# Patient Record
Sex: MALE | Race: WHITE | NOT HISPANIC OR LATINO | Employment: FULL TIME | URBAN - METROPOLITAN AREA
[De-identification: names, ages, dates, MRNs, and addresses within clinical notes are randomized per-mention and may not be internally consistent; named-entity substitution may affect disease eponyms.]

---

## 2021-01-11 ENCOUNTER — LAB VISIT (OUTPATIENT)
Dept: PRIMARY CARE CLINIC | Facility: OTHER | Age: 36
End: 2021-01-11
Attending: INTERNAL MEDICINE
Payer: OTHER GOVERNMENT

## 2021-01-11 DIAGNOSIS — Z20.822 ENCOUNTER FOR LABORATORY TESTING FOR COVID-19 VIRUS: ICD-10-CM

## 2021-01-11 PROCEDURE — U0003 INFECTIOUS AGENT DETECTION BY NUCLEIC ACID (DNA OR RNA); SEVERE ACUTE RESPIRATORY SYNDROME CORONAVIRUS 2 (SARS-COV-2) (CORONAVIRUS DISEASE [COVID-19]), AMPLIFIED PROBE TECHNIQUE, MAKING USE OF HIGH THROUGHPUT TECHNOLOGIES AS DESCRIBED BY CMS-2020-01-R: HCPCS

## 2021-01-13 LAB — SARS-COV-2 RNA RESP QL NAA+PROBE: NOT DETECTED

## 2025-05-19 ENCOUNTER — TELEPHONE (OUTPATIENT)
Dept: UROLOGY | Facility: CLINIC | Age: 40
End: 2025-05-19
Payer: COMMERCIAL

## 2025-05-19 ENCOUNTER — OFFICE VISIT (OUTPATIENT)
Dept: INTERNAL MEDICINE | Facility: CLINIC | Age: 40
End: 2025-05-19
Payer: COMMERCIAL

## 2025-05-19 VITALS
DIASTOLIC BLOOD PRESSURE: 66 MMHG | HEIGHT: 71 IN | SYSTOLIC BLOOD PRESSURE: 108 MMHG | WEIGHT: 122.13 LBS | HEART RATE: 87 BPM | OXYGEN SATURATION: 97 % | BODY MASS INDEX: 17.1 KG/M2

## 2025-05-19 DIAGNOSIS — F33.41 RECURRENT MAJOR DEPRESSIVE DISORDER, IN PARTIAL REMISSION: ICD-10-CM

## 2025-05-19 DIAGNOSIS — Z80.42 FAMILY HISTORY OF PROSTATE CANCER: ICD-10-CM

## 2025-05-19 DIAGNOSIS — Z00.00 ROUTINE GENERAL MEDICAL EXAMINATION AT A HEALTH CARE FACILITY: Primary | ICD-10-CM

## 2025-05-19 DIAGNOSIS — K21.9 GASTROESOPHAGEAL REFLUX DISEASE, UNSPECIFIED WHETHER ESOPHAGITIS PRESENT: ICD-10-CM

## 2025-05-19 PROCEDURE — 3074F SYST BP LT 130 MM HG: CPT | Mod: CPTII,S$GLB,, | Performed by: INTERNAL MEDICINE

## 2025-05-19 PROCEDURE — 3008F BODY MASS INDEX DOCD: CPT | Mod: CPTII,S$GLB,, | Performed by: INTERNAL MEDICINE

## 2025-05-19 PROCEDURE — 1160F RVW MEDS BY RX/DR IN RCRD: CPT | Mod: CPTII,S$GLB,, | Performed by: INTERNAL MEDICINE

## 2025-05-19 PROCEDURE — 1159F MED LIST DOCD IN RCRD: CPT | Mod: CPTII,S$GLB,, | Performed by: INTERNAL MEDICINE

## 2025-05-19 PROCEDURE — 3078F DIAST BP <80 MM HG: CPT | Mod: CPTII,S$GLB,, | Performed by: INTERNAL MEDICINE

## 2025-05-19 PROCEDURE — 99999 PR PBB SHADOW E&M-NEW PATIENT-LVL IV: CPT | Mod: PBBFAC,,, | Performed by: INTERNAL MEDICINE

## 2025-05-19 PROCEDURE — 99213 OFFICE O/P EST LOW 20 MIN: CPT | Mod: 25,S$GLB,, | Performed by: INTERNAL MEDICINE

## 2025-05-19 PROCEDURE — 99386 PREV VISIT NEW AGE 40-64: CPT | Mod: S$GLB,,, | Performed by: INTERNAL MEDICINE

## 2025-05-19 RX ORDER — BUPROPION HYDROCHLORIDE 150 MG/1
150 TABLET ORAL DAILY
Qty: 90 TABLET | Refills: 0 | Status: SHIPPED | OUTPATIENT
Start: 2025-05-19

## 2025-05-19 RX ORDER — OMEPRAZOLE 40 MG/1
40 CAPSULE, DELAYED RELEASE ORAL
Qty: 180 CAPSULE | Refills: 3 | Status: SHIPPED | OUTPATIENT
Start: 2025-05-19 | End: 2026-05-19

## 2025-05-19 NOTE — PROGRESS NOTES
"Subjective:      Patient ID: Keith Pelletier is a 40 y.o. male.    Chief Complaint: Annual Exam and Depression      Keith Pelletier is a 40 y.o. male with chronic conditions significant for MDD.  Presenting today for ECA.     The patient consents verbally to being recorded for DEMANDIT service today.     History of Present Illness    CHIEF COMPLAINT:  Mr. Pelletier presents today for severe reflux and depression.    PSYCHIATRIC HISTORY:  He reports lifelong intermittent mild depression. He has a history of adverse reaction to Risperdal resulting in hospitalization with seizures and muscle rigidity. He continues to experience panic attacks and tonic seizures which began after Risperdal use.    GASTROINTESTINAL:  He reports lifelong symptoms consistent with self-diagnosed irritable bowel syndrome (IBS).    SOCIAL HISTORY:  He currently smokes one pack of cigarettes daily and consumes alcohol. He quit marijuana 10 years ago and has past recreational use of cocaine and LSD, but denies current illicit drug use.    FAMILY HISTORY:  His father and uncle have history of prostate cancer.    ---------------------------  This part of the note is separate from the annual visit as this a new and separate problem(s) that was discussed during this visit. This section of the note is pertains to and is billable as part of the modifier 25.    GERD:  He experiences severe reflux symptoms, particularly in the morning, waking up gagging with symptoms persisting 1-2 hours after waking. The condition has been present for approximately 2 years but has significantly worsened over the past month, described as "dysfunctionally bad." A recent stomach illness exacerbated his symptoms, making it difficult to function until noon. The reflux has been severe enough to cause perceived muscle strain in his abdomen. He takes Omeprazole which helps when taken the night before.      ROS:  General: +trembling  Gastrointestinal: -nausea, " "-vomiting, +indigestion, +gagging, +change in bowel habits  Musculoskeletal: +muscle stiffness, +muscle tension  Psychiatric: +depression, +panic attacks          Current Medications[1]    No results found for: "HGBA1C"  No results found for: "MICALBCREAT"  No results found for: "LDLCALC", "CHOL", "HDL", "TRIG"    No results found for: "NA", "K", "CL", "CO2", "GLU", "BUN", "CREATININE", "CALCIUM", "PROT", "ALBUMIN", "BILITOT", "ALKPHOS", "AST", "ALT", "ANIONGAP", "ESTGFRAFRICA", "EGFRNONAA", "WBC", "HGB", "HCT", "MCV", "PLT", "TSH", "PSA", "PSADIAG", "HEPCAB"    No results found for: "LH", "FSH", "TOTALTESTOST", "PROGESTERONE", "ESTRADIOL", "[id number]", "[id number]", "FERRITIN", "IRON", "TRANSFERRIN", "TIBC", "FESATURATED", "ZINC"      History reviewed. No pertinent past medical history.  History reviewed. No pertinent surgical history.  Social History     Social History Narrative    Not on file     Family History   Problem Relation Name Age of Onset    Cancer Father Carin     Arthritis Father Carin     Hearing loss Father Carin     Drug abuse Brother Tj          Vitals:    05/19/25 0947   BP: 108/66   Pulse: 87   SpO2: 97%   Weight: 55.4 kg (122 lb 2.2 oz)   Height: 5' 11" (1.803 m)   PainSc: 0-No pain     Objective:      Physical Exam  Vitals and nursing note reviewed.   Constitutional:       Appearance: Normal appearance.   HENT:      Head: Normocephalic and atraumatic.      Right Ear: Tympanic membrane, ear canal and external ear normal.      Left Ear: Tympanic membrane, ear canal and external ear normal.      Nose: Nose normal.      Mouth/Throat:      Mouth: Mucous membranes are moist.      Pharynx: Oropharynx is clear.   Eyes:      Extraocular Movements: Extraocular movements intact.      Pupils: Pupils are equal, round, and reactive to light.   Cardiovascular:      Rate and Rhythm: Normal rate and regular rhythm.      Pulses: Normal pulses.      Heart sounds: Normal heart sounds.   Pulmonary:      " Breath sounds: Normal breath sounds.   Abdominal:      General: Bowel sounds are normal.      Palpations: Abdomen is soft.   Musculoskeletal:      Cervical back: Normal range of motion and neck supple.   Skin:     General: Skin is warm.   Neurological:      General: No focal deficit present.      Mental Status: He is alert and oriented to person, place, and time.         Assessment/Plan     Assessment & Plan    - Assessed significant reflux symptoms, including morning gagging and functional impairment.  - Evaluated need for prostate cancer screening due to family history.  - Determined need for comprehensive lab work given long absence from medical care.  - Considered history of mild depression and desire to quit smoking.    PLAN SUMMARY:  - Order fasting laboratory studies including prostate-specific antigen test  - Refer patient to urology for prostate exam  - Prescribe omeprazole 40 mg twice daily for 8 weeks  - Recommend [place] tablets in non-acidic juice for immediate reflux relief  - Prescribe Wellbutrin 150 mg daily for depression and smoking cessation  - Follow up in 6-8 weeks for virtual visit to assess medication effectiveness and review lab results  - Annual follow-up for health maintenance and prostate cancer screening    GASTRO-ESOPHAGEAL REFLUX DISEASE:  - Mr. Pelletier has been experiencing severe reflux for approximately 2 years, with significant exacerbation in the past month.  - Symptoms include morning gagging that resolves after 1-2 hours, described as dysfunctionally severe.  - Recent gastroenteritis may have impacted symptoms.  - Prescribed omeprazole 40 mg twice daily for at least 6 weeks, potentially 2 months, to heal the gastric mucosa.  - Mr. Pelletier may reduce to daily dosing after symptom improvement.  - Recommend [place] tablets in juice as needed for immediate reflux relief in the morning, but cautioned against using acidic juices.    MAJOR DEPRESSIVE DISORDER:  - Mr. Pelletier  reports mild depression occurring intermittently throughout life with no current pharmacotherapy.  - Discussed his desire to start treating his long standing depression, as he was initially wary of it due to his poor experience with anti depressants in the past.  - Prescribed Wellbutrin 150 mg daily for depression and smoking cessation.    ADVERSE EFFECT OF ANTIPSYCHOTICS:  - Mr. Pelletier experienced seizures and muscle rigidity after being prescribed risperidone in the past, resulting in fear of medications due to this adverse experience.    IRRITABLE BOWEL SYNDROME:  - Mr. Pelletier self-diagnosed with irritable bowel syndrome, experiencing symptoms throughout life.  - Assessed potential dietary influences on symptoms.    NICOTINE DEPENDENCE:  - Mr. Pelletier currently smokes 1 pack per day and is attempting smoking cessation.  - Assessed readiness for cessation and prescribed Wellbutrin 150 mg daily as a cessation aid.    FAMILY HISTORY OF PROSTATE CANCER:  - Mr. Pelletier has significant family history of prostate carcinoma, with both father and uncle affected.  - Ordered fasting laboratory studies including prostate-specific antigen test and referred patient for prostate exam.  - Recommend annual follow-up for ongoing health maintenance and prostate cancer screening.    FOLLOW-UP:  - Follow up in 6-8 weeks for virtual visit to assess medication effectiveness and review lab results.           Routine general medical examination at a health care facility  -     CBC Auto Differential; Future; Expected date: 05/19/2025  -     Comprehensive Metabolic Panel; Future; Expected date: 05/19/2025  -     TSH; Future; Expected date: 05/19/2025  -     Lipid Panel; Future; Expected date: 05/19/2025  -     PSA, Screening; Future; Expected date: 05/19/2025    Gastroesophageal reflux disease, unspecified whether esophagitis present  -     omeprazole (PRILOSEC) 40 MG capsule; Take 1 capsule (40 mg total) by mouth 2 (two) times daily before  meals.  Dispense: 180 capsule; Refill: 3    Recurrent major depressive disorder, in partial remission  -     buPROPion (WELLBUTRIN XL) 150 MG TB24 tablet; Take 1 tablet (150 mg total) by mouth once daily.  Dispense: 90 tablet; Refill: 0  -     TSH; Future; Expected date: 05/19/2025    Family history of prostate cancer  -     Ambulatory referral/consult to Urology; Future; Expected date: 05/26/2025        Chronic conditions status updated as per HPI.  Other than changes above, cont current medications and maintain follow up with specialists.  Return to clinic in Follow up in about 6 weeks (around 6/30/2025) for Virtual Visit.      Marybeth Cabrera MD  [facility]    Total time spent on this encounter: 32 minutes. This includes face to face time and non-face to face time preparing to see the patient (eg, review of tests), obtaining and/or reviewing separately obtained history, documenting clinical information in the electronic or other health record, independently interpreting results and communicating results to the patient/family/caregiver, or care coordinator.    This note was generated with the assistance of ambient listening technology. Verbal consent was obtained by the patient and accompanying visitor(s) for the recording of patient appointment to facilitate this note. I attest to having reviewed and edited the generated note for accuracy, though some syntax or spelling errors may persist. Please contact the author of this note for any clarification.       There are no Patient Instructions on file for this visit.  All of your core healthy metrics are met.                                 [1]   Current Outpatient Medications:     buPROPion (WELLBUTRIN XL) 150 MG TB24 tablet, Take 1 tablet (150 mg total) by mouth once daily., Disp: 90 tablet, Rfl: 0    omeprazole (PRILOSEC) 40 MG capsule, Take 1 capsule (40 mg total) by mouth 2 (two) times daily before meals., Disp: 180 capsule, Rfl: 3

## 2025-05-19 NOTE — TELEPHONE ENCOUNTER
Staff called pt to schedule appt from referral. Pt did not answer and staff was unable to leave vm instructing call back

## 2025-05-21 ENCOUNTER — TELEPHONE (OUTPATIENT)
Dept: UROLOGY | Facility: CLINIC | Age: 40
End: 2025-05-21
Payer: COMMERCIAL

## 2025-05-27 ENCOUNTER — LAB VISIT (OUTPATIENT)
Dept: LAB | Facility: OTHER | Age: 40
End: 2025-05-27
Attending: INTERNAL MEDICINE
Payer: COMMERCIAL

## 2025-05-27 DIAGNOSIS — Z00.00 ROUTINE GENERAL MEDICAL EXAMINATION AT A HEALTH CARE FACILITY: ICD-10-CM

## 2025-05-27 DIAGNOSIS — F33.41 RECURRENT MAJOR DEPRESSIVE DISORDER, IN PARTIAL REMISSION: ICD-10-CM

## 2025-05-27 LAB
ABSOLUTE EOSINOPHIL (OHS): 0.11 K/UL
ABSOLUTE MONOCYTE (OHS): 0.77 K/UL (ref 0.3–1)
ABSOLUTE NEUTROPHIL COUNT (OHS): 3.35 K/UL (ref 1.8–7.7)
ALBUMIN SERPL BCP-MCNC: 4.8 G/DL (ref 3.5–5.2)
ALP SERPL-CCNC: 134 UNIT/L (ref 40–150)
ALT SERPL W/O P-5'-P-CCNC: 87 UNIT/L (ref 10–44)
ANION GAP (OHS): 14 MMOL/L (ref 8–16)
AST SERPL-CCNC: 92 UNIT/L (ref 11–45)
BASOPHILS # BLD AUTO: 0.03 K/UL
BASOPHILS NFR BLD AUTO: 0.5 %
BILIRUB SERPL-MCNC: 1.1 MG/DL (ref 0.1–1)
BUN SERPL-MCNC: 14 MG/DL (ref 6–20)
CALCIUM SERPL-MCNC: 9.8 MG/DL (ref 8.7–10.5)
CHLORIDE SERPL-SCNC: 102 MMOL/L (ref 95–110)
CHOLEST SERPL-MCNC: 231 MG/DL (ref 120–199)
CHOLEST/HDLC SERPL: 1.8 {RATIO} (ref 2–5)
CO2 SERPL-SCNC: 24 MMOL/L (ref 23–29)
CREAT SERPL-MCNC: 0.8 MG/DL (ref 0.5–1.4)
ERYTHROCYTE [DISTWIDTH] IN BLOOD BY AUTOMATED COUNT: 12.6 % (ref 11.5–14.5)
GFR SERPLBLD CREATININE-BSD FMLA CKD-EPI: >60 ML/MIN/1.73/M2
GLUCOSE SERPL-MCNC: 75 MG/DL (ref 70–110)
HCT VFR BLD AUTO: 41.9 % (ref 40–54)
HDLC SERPL-MCNC: 130 MG/DL (ref 40–75)
HDLC SERPL: 56.3 % (ref 20–50)
HGB BLD-MCNC: 14.2 GM/DL (ref 14–18)
IMM GRANULOCYTES # BLD AUTO: 0.01 K/UL (ref 0–0.04)
IMM GRANULOCYTES NFR BLD AUTO: 0.2 % (ref 0–0.5)
LDLC SERPL CALC-MCNC: 91.6 MG/DL (ref 63–159)
LYMPHOCYTES # BLD AUTO: 1.83 K/UL (ref 1–4.8)
MCH RBC QN AUTO: 36.6 PG (ref 27–31)
MCHC RBC AUTO-ENTMCNC: 33.9 G/DL (ref 32–36)
MCV RBC AUTO: 108 FL (ref 82–98)
NONHDLC SERPL-MCNC: 101 MG/DL
NUCLEATED RBC (/100WBC) (OHS): 0 /100 WBC
PLATELET # BLD AUTO: 191 K/UL (ref 150–450)
PMV BLD AUTO: 9.9 FL (ref 9.2–12.9)
POTASSIUM SERPL-SCNC: 4.5 MMOL/L (ref 3.5–5.1)
PROT SERPL-MCNC: 8.4 GM/DL (ref 6–8.4)
PSA SERPL-MCNC: 2.43 NG/ML
RBC # BLD AUTO: 3.88 M/UL (ref 4.6–6.2)
RELATIVE EOSINOPHIL (OHS): 1.8 %
RELATIVE LYMPHOCYTE (OHS): 30 % (ref 18–48)
RELATIVE MONOCYTE (OHS): 12.6 % (ref 4–15)
RELATIVE NEUTROPHIL (OHS): 54.9 % (ref 38–73)
SODIUM SERPL-SCNC: 140 MMOL/L (ref 136–145)
TRIGL SERPL-MCNC: 47 MG/DL (ref 30–150)
TSH SERPL-ACNC: 2.92 UIU/ML (ref 0.4–4)
WBC # BLD AUTO: 6.1 K/UL (ref 3.9–12.7)

## 2025-05-27 PROCEDURE — 80061 LIPID PANEL: CPT

## 2025-05-27 PROCEDURE — 85025 COMPLETE CBC W/AUTO DIFF WBC: CPT

## 2025-05-27 PROCEDURE — 82040 ASSAY OF SERUM ALBUMIN: CPT

## 2025-05-27 PROCEDURE — 84153 ASSAY OF PSA TOTAL: CPT

## 2025-05-27 PROCEDURE — 36415 COLL VENOUS BLD VENIPUNCTURE: CPT

## 2025-05-27 PROCEDURE — 84443 ASSAY THYROID STIM HORMONE: CPT

## 2025-06-03 ENCOUNTER — RESULTS FOLLOW-UP (OUTPATIENT)
Dept: INTERNAL MEDICINE | Facility: CLINIC | Age: 40
End: 2025-06-03

## 2025-06-30 ENCOUNTER — OFFICE VISIT (OUTPATIENT)
Dept: INTERNAL MEDICINE | Facility: CLINIC | Age: 40
End: 2025-06-30
Payer: COMMERCIAL

## 2025-06-30 DIAGNOSIS — K21.9 GASTROESOPHAGEAL REFLUX DISEASE, UNSPECIFIED WHETHER ESOPHAGITIS PRESENT: ICD-10-CM

## 2025-06-30 DIAGNOSIS — R79.89 ELEVATED LFTS: ICD-10-CM

## 2025-06-30 DIAGNOSIS — F33.41 RECURRENT MAJOR DEPRESSIVE DISORDER, IN PARTIAL REMISSION: Primary | ICD-10-CM

## 2025-06-30 PROCEDURE — 1160F RVW MEDS BY RX/DR IN RCRD: CPT | Mod: CPTII,95,, | Performed by: INTERNAL MEDICINE

## 2025-06-30 PROCEDURE — 1159F MED LIST DOCD IN RCRD: CPT | Mod: CPTII,95,, | Performed by: INTERNAL MEDICINE

## 2025-06-30 PROCEDURE — 98006 SYNCH AUDIO-VIDEO EST MOD 30: CPT | Mod: 95,,, | Performed by: INTERNAL MEDICINE

## 2025-06-30 PROCEDURE — G2211 COMPLEX E/M VISIT ADD ON: HCPCS | Mod: 95,,, | Performed by: INTERNAL MEDICINE

## 2025-06-30 RX ORDER — BUPROPION HYDROCHLORIDE 300 MG/1
300 TABLET ORAL DAILY
Qty: 90 TABLET | Refills: 0 | Status: SHIPPED | OUTPATIENT
Start: 2025-06-30

## 2025-06-30 RX ORDER — OMEPRAZOLE 40 MG/1
CAPSULE, DELAYED RELEASE ORAL
Qty: 270 CAPSULE | Refills: 3 | Status: SHIPPED | OUTPATIENT
Start: 2025-06-30 | End: 2026-06-30

## 2025-06-30 NOTE — PROGRESS NOTES
The patient location is: [place]  The chief complaint leading to consultation is: MDD    Visit type: audiovisual    Face to Face time with patient: 15  32 minutes of total time spent on the encounter, which includes face to face time and non-face to face time preparing to see the patient (eg, review of tests), Obtaining and/or reviewing separately obtained history, Documenting clinical information in the electronic or other health record, Independently interpreting results (not separately reported) and communicating results to the patient/family/caregiver, or Care coordination (not separately reported).         Each patient to whom he or she provides medical services by telemedicine is:  (1) informed of the relationship between the physician and patient and the respective role of any other health care provider with respect to management of the patient; and (2) notified that he or she may decline to receive medical services by telemedicine and may withdraw from such care at any time.    Notes:        Subjective:      Patient ID: Keith Pelletier is a 40 y.o. male.    Chief Complaint: No chief complaint on file.    Keith Pelletier is a 40 y.o. male with chronic conditions significant for MDD, GERD.     MDD/COURTNEY: Patient presents today after initiation of Wellbutrin to help manage mood. The patient has not noted changes since being started on the medication. Reports no side effects. Denies SI/HI at this time. Discussed the medication should NOT be stopped abruptly as it can lead to withdrawal symptoms. Will increase the dose to 300mg at this time. Patient to alert MD if concerning symptoms such as SI, HI, worsening mood occur.      GERD: Started on omeprazole BID at last visit when as needed omeprazole was not adequately controlling his GERD sx. Reports that his sx is improved but continues to still have GERD sx, especially in the morning. He reports gagging some of the days. No weight loss.     Denies any chest pain, shortness  "of breath, nausea vomiting constipation diarrhea, blood in stool, heartburn    Visit today is associated with current or anticipated ongoing medical care related to this patient's single serious condition/complex condition of MDD, GERD. The patient will return to see me as these issues will be followed longitudinally.    Review of Systems   Constitutional:  Negative for chills, fever and weight loss.   HENT:  Negative for congestion, ear pain, hearing loss and sore throat.    Eyes:  Negative for double vision and discharge.   Respiratory:  Negative for cough, shortness of breath and wheezing.    Cardiovascular:  Negative for chest pain, palpitations and leg swelling.   Gastrointestinal:  Negative for abdominal pain, blood in stool, constipation, diarrhea, heartburn, nausea and vomiting.   Genitourinary:  Negative for hematuria and urgency.   Musculoskeletal:  Negative for neck pain.   Skin:  Negative for rash.   Neurological:  Negative for dizziness, tingling, weakness and headaches.   Endo/Heme/Allergies:  Negative for polydipsia.   Psychiatric/Behavioral:  Negative for depression.        Current Medications[1]    No results found for: "HGBA1C"  No results found for: "MICALBCREAT"  Lab Results   Component Value Date    LDLCALC 91.6 05/27/2025    CHOL 231 (H) 05/27/2025     (H) 05/27/2025    TRIG 47 05/27/2025       Lab Results   Component Value Date     05/27/2025    K 4.5 05/27/2025     05/27/2025    CO2 24 05/27/2025    GLU 75 05/27/2025    BUN 14 05/27/2025    CREATININE 0.8 05/27/2025    CALCIUM 9.8 05/27/2025    PROT 8.4 05/27/2025    ALBUMIN 4.8 05/27/2025    BILITOT 1.1 (H) 05/27/2025    ALKPHOS 134 05/27/2025    AST 92 (H) 05/27/2025    ALT 87 (H) 05/27/2025    ANIONGAP 14 05/27/2025    WBC 6.10 05/27/2025    HGB 14.2 05/27/2025    HCT 41.9 05/27/2025     (H) 05/27/2025     05/27/2025    TSH 2.923 05/27/2025    PSA 2.43 05/27/2025       No results found for: "LH", "FSH", " ""TOTALTESTOST", "PROGESTERONE", "ESTRADIOL", "[id number]", "[id number]", "FERRITIN", "IRON", "TRANSFERRIN", "TIBC", "FESATURATED", "ZINC"      History reviewed. No pertinent past medical history.  History reviewed. No pertinent surgical history.  Social History     Social History Narrative    Not on file     Family History   Problem Relation Name Age of Onset    Cancer Father Carin         prostate    Arthritis Father Carin     Hearing loss Father Carin     Drug abuse Brother Tj      There were no vitals filed for this visit.  Objective:   Physical Exam  Constitutional:       Appearance: Normal appearance.   HENT:      Head: Normocephalic.      Nose: Nose normal.   Neurological:      Mental Status: He is alert and oriented to person, place, and time. Mental status is at baseline.   Psychiatric:         Mood and Affect: Mood normal.         Behavior: Behavior normal.       Assessment:     1. Recurrent major depressive disorder, in partial remission    2. Gastroesophageal reflux disease, unspecified whether esophagitis present    3. Elevated LFTs      Plan:     Orders Placed This Encounter    Hepatic Function Panel    buPROPion (WELLBUTRIN XL) 300 MG 24 hr tablet    omeprazole (PRILOSEC) 40 MG capsule       There are no Patient Instructions on file for this visit.  All of your core healthy metrics are met.    Answers submitted by the patient for this visit:  Review of Systems Questionnaire (Submitted on 6/30/2025)  activity change: No  unexpected weight change: No  rhinorrhea: No  trouble swallowing: No  visual disturbance: No  chest tightness: No  polyuria: No  difficulty urinating: No  joint swelling: No  arthralgias: No  confusion: No  dysphoric mood: No         [1]   Current Outpatient Medications:     buPROPion (WELLBUTRIN XL) 300 MG 24 hr tablet, Take 1 tablet (300 mg total) by mouth once daily., Disp: 90 tablet, Rfl: 0    omeprazole (PRILOSEC) 40 MG capsule, Take 1 capsule (40 mg total) by mouth every " morning AND 2 capsules (80 mg total) every evening., Disp: 270 capsule, Rfl: 3

## 2025-08-04 ENCOUNTER — OFFICE VISIT (OUTPATIENT)
Dept: INTERNAL MEDICINE | Facility: CLINIC | Age: 40
End: 2025-08-04

## 2025-08-04 ENCOUNTER — TELEPHONE (OUTPATIENT)
Dept: INTERNAL MEDICINE | Facility: CLINIC | Age: 40
End: 2025-08-04

## 2025-08-04 DIAGNOSIS — K21.9 GASTROESOPHAGEAL REFLUX DISEASE, UNSPECIFIED WHETHER ESOPHAGITIS PRESENT: ICD-10-CM

## 2025-08-04 DIAGNOSIS — F33.41 RECURRENT MAJOR DEPRESSIVE DISORDER, IN PARTIAL REMISSION: ICD-10-CM

## 2025-08-04 PROCEDURE — G2211 COMPLEX E/M VISIT ADD ON: HCPCS | Mod: 95,,, | Performed by: INTERNAL MEDICINE

## 2025-08-04 PROCEDURE — 98006 SYNCH AUDIO-VIDEO EST MOD 30: CPT | Mod: 95,,, | Performed by: INTERNAL MEDICINE

## 2025-08-04 RX ORDER — BUPROPION HYDROCHLORIDE 300 MG/1
300 TABLET ORAL DAILY
Qty: 90 TABLET | Refills: 3 | Status: SHIPPED | OUTPATIENT
Start: 2025-08-04

## 2025-08-04 RX ORDER — OMEPRAZOLE 40 MG/1
CAPSULE, DELAYED RELEASE ORAL
Qty: 270 CAPSULE | Refills: 3 | Status: SHIPPED | OUTPATIENT
Start: 2025-08-04 | End: 2026-08-04

## 2025-08-04 NOTE — PROGRESS NOTES
The patient location is: [place]  The chief complaint leading to consultation is: MDD    Visit type: audiovisual    Face to Face time with patient: 15  32 minutes of total time spent on the encounter, which includes face to face time and non-face to face time preparing to see the patient (eg, review of tests), Obtaining and/or reviewing separately obtained history, Documenting clinical information in the electronic or other health record, Independently interpreting results (not separately reported) and communicating results to the patient/family/caregiver, or Care coordination (not separately reported).         Each patient to whom he or she provides medical services by telemedicine is:  (1) informed of the relationship between the physician and patient and the respective role of any other health care provider with respect to management of the patient; and (2) notified that he or she may decline to receive medical services by telemedicine and may withdraw from such care at any time.    Notes:        Subjective:      Patient ID: Keith Pelletier is a 40 y.o. male.    Chief Complaint: Follow-up (Medication/)    Keith Pelletier is a 40 y.o. male with chronic conditions significant for MDD, GERD.     MDD/COURTNEY: Patient presents today after initiation of Wellbutrin to help manage mood. The patient has noted positive changes since the medication dosage was increased. Reports no side effects. Denies SI/HI at this time. Discussed the medication should NOT be stopped abruptly as it can lead to withdrawal symptoms. Continue with the 300mg dose at this time. Patient to alert MD if concerning symptoms such as SI, HI, worsening mood occur.       GERD: Started on omeprazole BID at last visit when as needed omeprazole was not adequately controlling his GERD sx. Currently takes medication as 40mg omeprazole qam and 80mg omeprazole qpm. Reports that his sx is much improved but continues to still have GERD sx, especially in the morning. He  "reports gagging some of the days. No weight loss.      Denies any chest pain, shortness of breath, nausea vomiting constipation diarrhea, blood in stool, heartburn     Visit today is associated with current or anticipated ongoing medical care related to this patient's single serious condition/complex condition of MDD, GERD. The patient will return to see me as these issues will be followed longitudinally.    Answers submitted by the patient for this visit:  Review of Systems Questionnaire (Submitted on 8/4/2025)  activity change: No  unexpected weight change: No  rhinorrhea: No  trouble swallowing: No  visual disturbance: No  chest tightness: No  polyuria: No  difficulty urinating: No  joint swelling: No  arthralgias: No  confusion: No  dysphoric mood: No      Denies any chest pain, shortness of breath, nausea vomiting constipation diarrhea, blood in stool, heartburn    Review of Systems   HENT:  Negative for hearing loss.    Eyes:  Negative for discharge.   Respiratory:  Negative for wheezing.    Cardiovascular:  Negative for chest pain and palpitations.   Gastrointestinal:  Negative for blood in stool, constipation, diarrhea and vomiting.   Genitourinary:  Negative for hematuria and urgency.   Musculoskeletal:  Negative for neck pain.   Neurological:  Negative for weakness and headaches.   Endo/Heme/Allergies:  Negative for polydipsia.       Current Medications[1]    No results found for: "HGBA1C"  No results found for: "MICALBCREAT"  Lab Results   Component Value Date    LDLCALC 91.6 05/27/2025    CHOL 231 (H) 05/27/2025     (H) 05/27/2025    TRIG 47 05/27/2025       Lab Results   Component Value Date     05/27/2025    K 4.5 05/27/2025     05/27/2025    CO2 24 05/27/2025    GLU 75 05/27/2025    BUN 14 05/27/2025    CREATININE 0.8 05/27/2025    CALCIUM 9.8 05/27/2025    PROT 8.4 05/27/2025    ALBUMIN 4.8 05/27/2025    BILITOT 1.1 (H) 05/27/2025    ALKPHOS 134 05/27/2025    AST 92 (H) 05/27/2025 " "   ALT 87 (H) 05/27/2025    ANIONGAP 14 05/27/2025    WBC 6.10 05/27/2025    HGB 14.2 05/27/2025    HCT 41.9 05/27/2025     (H) 05/27/2025     05/27/2025    TSH 2.923 05/27/2025    PSA 2.43 05/27/2025       No results found for: "LH", "FSH", "TOTALTESTOST", "PROGESTERONE", "ESTRADIOL", "[id number]", "[id number]", "FERRITIN", "IRON", "TRANSFERRIN", "TIBC", "FESATURATED", "ZINC"      History reviewed. No pertinent past medical history.  History reviewed. No pertinent surgical history.  Social History     Social History Narrative    Not on file     Family History   Problem Relation Name Age of Onset    Cancer Father Carin         prostate    Arthritis Father Carin     Hearing loss Father Carin     Drug abuse Brother Tj      There were no vitals filed for this visit.  Objective:   Physical Exam  Constitutional:       Appearance: Normal appearance.   HENT:      Head: Normocephalic.      Nose: Nose normal.   Neurological:      Mental Status: He is alert and oriented to person, place, and time. Mental status is at baseline.   Psychiatric:         Mood and Affect: Mood normal.         Behavior: Behavior normal.       Assessment:     1. Recurrent major depressive disorder, in partial remission    2. Gastroesophageal reflux disease, unspecified whether esophagitis present      Plan:     Orders Placed This Encounter    H. pylori Antibody, IgG    Helicobacter Pylori Ab (IgM)    buPROPion (WELLBUTRIN XL) 300 MG 24 hr tablet    omeprazole (PRILOSEC) 40 MG capsule       There are no Patient Instructions on file for this visit.  All of your core healthy metrics are met.         [1]   Current Outpatient Medications:     buPROPion (WELLBUTRIN XL) 300 MG 24 hr tablet, Take 1 tablet (300 mg total) by mouth once daily., Disp: 90 tablet, Rfl: 3    omeprazole (PRILOSEC) 40 MG capsule, Take 1 capsule (40 mg total) by mouth every morning AND 2 capsules (80 mg total) every evening., Disp: 270 capsule, Rfl: 3    "